# Patient Record
Sex: MALE | Race: WHITE | NOT HISPANIC OR LATINO | Employment: UNEMPLOYED | URBAN - METROPOLITAN AREA
[De-identification: names, ages, dates, MRNs, and addresses within clinical notes are randomized per-mention and may not be internally consistent; named-entity substitution may affect disease eponyms.]

---

## 2017-01-03 ENCOUNTER — OFFICE VISIT (OUTPATIENT)
Dept: BEHAVIORAL HEALTH | Facility: CLINIC | Age: 57
End: 2017-01-03
Payer: COMMERCIAL

## 2017-01-03 DIAGNOSIS — F33.1 MAJOR DEPRESSIVE DISORDER, RECURRENT EPISODE, MODERATE (H): Primary | ICD-10-CM

## 2017-01-03 PROCEDURE — 90834 PSYTX W PT 45 MINUTES: CPT | Performed by: PSYCHOLOGIST

## 2017-01-03 NOTE — PROGRESS NOTES
Progress Note    Client Name: Darrius Goldman  Date: 01/03/17    Service Type: Individual There was an  present. Session Start Time: 4:00 Session End Time: 3:00 Session Length: 50     Session 8     Attendees: Client attended alone    Treatment Plan Last Reviewed:   PHQ-9 / GIAN-7 :      DATA      Progress Since Last Session (Related to Symptoms / Goals / Homework):   Symptoms: Stable  I saw Darrius today.  .  Darrius came in and said that he is doing OK.  Because of so little money there is little he can do.  He is hoping that things change.  I tried to push some at getting him to be more active but he is not wanting to do that.  He does have a friend and a sister he does spend time with.  He has contacted an  and the process will started again.     Episode of Care Goals: Satisfactory progress - PREPARATION (Decided to change - considering how); Intervened by negotiating a change plan and determining options / strategies for behavior change, identifying triggers, exploring social supports, and working towards setting a date to begin behavior change     Current / Ongoing Stressors and Concerns:   He is taking care of himself, accepting what he needs to in terms of finances and health.    My fears are that life is on hold because of lack of funds.   Treatment Objective(s) Addressed in This Session:   identify 2 strategies for improving mood  So far the art work and contact with friends is the only things I have identified.     Intervention:   CBT: .        ASSESSMENT: Current Emotional / Mental Status (status of significant symptoms):   Risk status (Self / Other harm or suicidal ideation)   Client denies current fears or concerns for personal safety.   Client denies current or recent suicidal ideation or behaviors.   Client denies current or recent homicidal ideation or behaviors.   Client denies current or recent self injurious behavior or ideation.   Client  denies other safety concerns.   A safety and risk management plan has not been developed at this time, however client was given the after-hours number / 911 should there be a change in any of these risk factors.     Appearance:   Appropriate    Eye Contact:   Good    Psychomotor Behavior: Normal    Attitude:   Cooperative    Orientation:   All   Speech    Rate / Production: Normal     Volume:  Normal    Mood:    Normal   Affect:    Appropriate    Thought Content:  Clear    Thought Form:  Coherent  Logical    Insight:    Good      Medication Review:   No current psychiatric medications prescribed     Medication Compliance:   NA     Changes in Health Issues:   There are no changes in health but sleep is still difficult because of pain.     Chemical Use Review:   Substance Use: Chemical use reviewed, no active concerns identified      Tobacco Use: No current tobacco use.       Collateral Reports Completed:   Not Applicable    PLAN: (Client Tasks / Therapist Tasks / Other)  See in a few weeks.  He has obtained an .        Doris Chavez LP                                                         ________________________________________________________________________    Treatment Plan    Client's Name: Darrius Goldman  YOB: 1960    Date: 07/28/16    DSM-V Diagnoses: 300.4 Persistent Depressive Disorder, Moderate  Psychosocial / Contextual Factors: Immigrant with few personal contacts.  He does not see family and cannot work at this time.  WHODAS:     Referral / Collaboration:  Was/were discussed and client will pursue. If he would agree I would make referral for .    Anticipated number of session or this episode of care: 6-8      MeasurableTreatment Goal(s) related to diagnosis / functional impairment(s)  Goal 1: Client will continue the process of trying to get SS.  This is to provide a stable income for rent payments and basic necessities. At this time he gets the rent payments from  friends, but that is not guaranteed.    I will know I've met my goal when my basic needs are met from my income freeing me up to per chasity other things.     Objective #A (Client Action)    Client will identify 2 strategies for improving mood.  Status: New - Date: 07/28/16     Intervention(s)  Therapist will assign homework Mensur will continue to do the art work that bring him pleasure..    Objective #B  Client will identify 2 strategies for improving mood.  Status: New - Date: 07/28/16     Intervention(s)  Therapist will encourage him to see friends from the [facility] community..    Objective #C  Client will identify continue to persue the SS. strategies for improving mood.  Status: New - Date: 07/28/16     Intervention(s)  Therapist will help with any forms that could help the process along or validate his depression..    Cale   has reviewed and agreed to the above plan.      Doris Chavez LP  July 31, 2016

## 2017-01-24 ENCOUNTER — OFFICE VISIT (OUTPATIENT)
Dept: BEHAVIORAL HEALTH | Facility: CLINIC | Age: 57
End: 2017-01-24
Payer: COMMERCIAL

## 2017-01-24 DIAGNOSIS — F33.1 MAJOR DEPRESSIVE DISORDER, RECURRENT EPISODE, MODERATE (H): Primary | ICD-10-CM

## 2017-01-24 PROCEDURE — 90834 PSYTX W PT 45 MINUTES: CPT | Performed by: PSYCHOLOGIST

## 2017-01-28 NOTE — PROGRESS NOTES
Progress Note    Client Name: Darrius Goldman  Date: 01/24/17Service Type: Individual There was an  present. Session Start Time: 4:00 Session End Time: 3:00 Session Length: 50     Session 9     Attendees: Client attended alone    Treatment Plan Last Reviewed:   PHQ-9 / GIAN-7 :      DATA      Progress Since Last Session (Related to Symptoms / Goals / Homework):   Symptoms: Stable  I saw Darrius today.  .  Darrius came in and said that he is doing OK.  Because of so little money there is little he can do.  He is hoping that things change.  I tried to push some at getting him to be more active but he is not wanting to do that.  He does have a friend and a sister he does spend time with.  He has contacted an  and the process will started again. We did fill out the forms that he needs for the [facility].      Episode of Care Goals: Satisfactory progress - PREPARATION (Decided to change - considering how); Intervened by negotiating a change plan and determining options / strategies for behavior change, identifying triggers, exploring social supports, and working towards setting a date to begin behavior change     Current / Ongoing Stressors and Concerns:   He is taking care of himself, accepting what he needs to in terms of finances and health.    My fears are that life is on hold because of lack of funds.   Treatment Objective(s) Addressed in This Session:   identify 2 strategies for improving mood  So far the art work and contact with friends is the only things I have identified.     Intervention:   CBT: .        ASSESSMENT: Current Emotional / Mental Status (status of significant symptoms):   Risk status (Self / Other harm or suicidal ideation)   Client denies current fears or concerns for personal safety.   Client denies current or recent suicidal ideation or behaviors.   Client denies current or recent homicidal ideation or behaviors.   Client denies current or  recent self injurious behavior or ideation.   Client denies other safety concerns.   A safety and risk management plan has not been developed at this time, however client was given the after-hours number / 911 should there be a change in any of these risk factors.     Appearance:   Appropriate    Eye Contact:   Good    Psychomotor Behavior: Normal    Attitude:   Cooperative    Orientation:   All   Speech    Rate / Production: Normal     Volume:  Normal    Mood:    Normal   Affect:    Appropriate    Thought Content:  Clear    Thought Form:  Coherent  Logical    Insight:    Good      Medication Review:   No current psychiatric medications prescribed     Medication Compliance:   NA     Changes in Health Issues:   There are no changes in health but sleep is still difficult because of pain.     Chemical Use Review:   Substance Use: Chemical use reviewed, no active concerns identified      Tobacco Use: No current tobacco use.       Collateral Reports Completed:   Not Applicable    PLAN: (Client Tasks / Therapist Tasks / Other)  See in a few weeks.  He has obtained an .        Doris Chavez LP                                                         ________________________________________________________________________    Treatment Plan    Client's Name: Darrius Goldman  YOB: 1960    Date: 07/28/16    DSM-V Diagnoses: 300.4 Persistent Depressive Disorder, Moderate  Psychosocial / Contextual Factors: Immigrant with few personal contacts.  He does not see family and cannot work at this time.  WHODAS:     Referral / Collaboration:  Was/were discussed and client will pursue. If he would agree I would make referral for .    Anticipated number of session or this episode of care: 6-8      MeasurableTreatment Goal(s) related to diagnosis / functional impairment(s)  Goal 1: Client will continue the process of trying to get SS.  This is to provide a stable income for rent payments and basic  necessities. At this time he gets the rent payments from friends, but that is not guaranteed.    I will know I've met my goal when my basic needs are met from my income freeing me up to per chasity other things.     Objective #A (Client Action)    Client will identify 2 strategies for improving mood.  Status: New - Date: 07/28/16     Intervention(s)  Therapist will assign homework Menscesar will continue to do the art work that bring him pleasure..    Objective #B  Client will identify 2 strategies for improving mood.  Status: New - Date: 07/28/16     Intervention(s)  Therapist will encourage him to see friends from the [facility] community..    Objective #C  Client will identify continue to persue the SS. strategies for improving mood.  Status: New - Date: 07/28/16     Intervention(s)  Therapist will help with any forms that could help the process along or validate his depression..    Cale   has reviewed and agreed to the above plan.      Doris Chavez LP  July 31, 2016

## 2017-02-14 ENCOUNTER — OFFICE VISIT (OUTPATIENT)
Dept: BEHAVIORAL HEALTH | Facility: CLINIC | Age: 57
End: 2017-02-14
Payer: COMMERCIAL

## 2017-02-14 DIAGNOSIS — F33.1 MAJOR DEPRESSIVE DISORDER, RECURRENT EPISODE, MODERATE (H): Primary | ICD-10-CM

## 2017-02-14 PROCEDURE — 90834 PSYTX W PT 45 MINUTES: CPT | Performed by: PSYCHOLOGIST

## 2017-02-18 NOTE — PROGRESS NOTES
Progress Note    Client Name: Darrius Goldman  Date: 02/14/17Service Type: Individual There was an  present. Session Start Time: 12:30 Session End Time: 1;30 Session Length: 50     Session 10     Attendees: Client attended alone    Treatment Plan Last Reviewed:   PHQ-9 / GIAN-7 :      DATA      Progress Since Last Session (Related to Symptoms / Goals / Homework):   Symptoms: Stable  I saw Darrius today. He was present with the .  States that he has been more sad and down of late.  He has seen his sister and does  Go there every couple of weeks.  Also sees his friend who pays for his rent about 2 times a month.  Outside of that, I am not hearing about things that he does.  He has not gotten SS yet and there is no news on it.  He had received SS but after the cancer diagnosis was in remission with no new signs of cancer , he stopped getting it.  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  He will call his  to see what is happening.  We did talk about treatment plan and what he gets from coming here.  He says that he feels that he trust and benefits from our conversations.  We do not have any strictly behavioral goals.  If anything a behavioral activation plan would make sense if he would do it.    .         Episode of Care Goals: Satisfactory progress - PREPARATION (Decided to change - considering how); Intervened by negotiating a change plan and determining options / strategies for behavior change, identifying triggers, exploring social supports, and working towards setting a date to begin behavior change     Current / Ongoing Stressors and Concerns:   He is taking care of himself, accepting what he needs to in terms of finances and health.    My fears are that life is on hold because of lack of funds.   Treatment Objective(s) Addressed in This Session:   identify 2  strategies for improving mood  So far the art work and contact with friends is the only things I have identified.     Intervention:   CBT: .        ASSESSMENT: Current Emotional / Mental Status (status of significant symptoms):   Risk status (Self / Other harm or suicidal ideation)   Client denies current fears or concerns for personal safety.   Client denies current or recent suicidal ideation or behaviors.   Client denies current or recent homicidal ideation or behaviors.   Client denies current or recent self injurious behavior or ideation.   Client denies other safety concerns.   A safety and risk management plan has not been developed at this time, however client was given the after-hours number / 911 should there be a change in any of these risk factors.     Appearance:   Appropriate    Eye Contact:   Good    Psychomotor Behavior: Normal    Attitude:   Cooperative    Orientation:   All   Speech    Rate / Production: Normal     Volume:  Normal    Mood:    Normal   Affect:    Appropriate    Thought Content:  Clear    Thought Form:  Coherent  Logical    Insight:    Good      Medication Review:   No current psychiatric medications prescribed     Medication Compliance:   NA     Changes in Health Issues:   There are no changes in health but sleep is still difficult because of pain.     Chemical Use Review:   Substance Use: Chemical use reviewed, no active concerns identified      Tobacco Use: No current tobacco use.       Collateral Reports Completed:   Not Applicable    PLAN: (Client Tasks / Therapist Tasks / Other)  See in a few weeks.  He has obtained an .        Doris Chavez LP                                                         ________________________________________________________________________    Treatment Plan    Client's Name: Darrius oGldman  YOB: 1960    Date: 07/28/16    DSM-V Diagnoses: 300.4 Persistent Depressive Disorder, Moderate  Psychosocial / Contextual Factors:  Immigrant with few personal contacts.  He does not see family and cannot work at this time.  WHODAS:     Referral / Collaboration:  Was/were discussed and client will pursue. If he would agree I would make referral for .    Anticipated number of session or this episode of care: 6-8      MeasurableTreatment Goal(s) related to diagnosis / functional impairment(s)  Goal 1: Client will continue the process of trying to get SS.  This is to provide a stable income for rent payments and basic necessities. At this time he gets the rent payments from friends, but that is not guaranteed.    I will know I've met my goal when my basic needs are met from my income freeing me up to per chasity other things.     Objective #A (Client Action)    Client will identify 2 strategies for improving mood.  Status: New - Date: 07/28/16     Intervention(s)  Therapist will assign homework Mensur will continue to do the art work that bring him pleasure..    Objective #B  Client will identify 2 strategies for improving mood.  Status: New - Date: 07/28/16     Intervention(s)  Therapist will encourage him to see friends from the [facility] community..    Objective #C  Client will identify continue to persue the SS. strategies for improving mood.  Status: New - Date: 07/28/16     Intervention(s)  Therapist will help with any forms that could help the process along or validate his depression..    Cale   has reviewed and agreed to the above plan.      Doris Chavez LP  July 31, 2016

## 2017-03-07 ENCOUNTER — OFFICE VISIT (OUTPATIENT)
Dept: BEHAVIORAL HEALTH | Facility: CLINIC | Age: 57
End: 2017-03-07
Payer: COMMERCIAL

## 2017-03-07 DIAGNOSIS — F33.1 MAJOR DEPRESSIVE DISORDER, RECURRENT EPISODE, MODERATE (H): Primary | ICD-10-CM

## 2017-03-07 PROCEDURE — 90834 PSYTX W PT 45 MINUTES: CPT | Performed by: PSYCHOLOGIST

## 2017-03-11 NOTE — PROGRESS NOTES
Progress Note    Client Name: Darrius Goldman  Date:   03/07/17    Service Type: Individual There was an  present. Session Start Time: 12:30 Session End Time: 1;30 Session Length: 50     Session 11     Attendees: Client attended alone    Treatment Plan Last Reviewed:   PHQ-9 / GIAN-7 :      DATA      Progress Since Last Session (Related to Symptoms / Goals / Homework):   Symptoms: Stable  I saw Darrius today. He was present with the .  States that he has been more sad and down of late. He did talk to his  and there are some things that SS is waiting for to finish the paperwork for the claim.  He is thnking it may be closer to a year before he is able to get something.  He has not been working and I did ask if he felt that he could.  He has had more back problems of late and in more pain.  I have talked to him about some work that would not have the physical demands that galo [employer] did.  He is bright, able and although somewhat depressed, he does like people and I think he might do well well with some part time jobs that are not physically demanding.   He does see his friend, and his sister.       Episode of Care Goals: Satisfactory progress - PREPARATION (Decided to change - considering how); Intervened by negotiating a change plan and determining options / strategies for behavior change, identifying triggers, exploring social supports, and working towards setting a date to begin behavior change     Current / Ongoing Stressors and Concerns:   He is taking care of himself, accepting what he needs to in terms of finances and health.    My fears are that life is on hold because of lack of funds.   Treatment Objective(s) Addressed in This Session:   identify 2 strategies for improving mood  So far the art work and contact with friends is the only things I have identified.     Intervention:   CBT: .        ASSESSMENT: Current Emotional / Mental  Status (status of significant symptoms):   Risk status (Self / Other harm or suicidal ideation)   Client denies current fears or concerns for personal safety.   Client denies current or recent suicidal ideation or behaviors.   Client denies current or recent homicidal ideation or behaviors.   Client denies current or recent self injurious behavior or ideation.   Client denies other safety concerns.   A safety and risk management plan has not been developed at this time, however client was given the after-hours number / 911 should there be a change in any of these risk factors.     Appearance:   Appropriate    Eye Contact:   Good    Psychomotor Behavior: Normal    Attitude:   Cooperative    Orientation:   All   Speech    Rate / Production: Normal     Volume:  Normal    Mood:    Normal   Affect:    Appropriate    Thought Content:  Clear    Thought Form:  Coherent  Logical    Insight:    Good      Medication Review:   No current psychiatric medications prescribed     Medication Compliance:   NA     Changes in Health Issues:   There are no changes in health but sleep is still difficult because of pain.     Chemical Use Review:   Substance Use: Chemical use reviewed, no active concerns identified      Tobacco Use: No current tobacco use.       Collateral Reports Completed:   Not Applicable    PLAN: (Client Tasks / Therapist Tasks / Other)  See in a few weeks.  He has obtained an .        Doris Chavez LP                                                         ________________________________________________________________________    Treatment Plan    Client's Name: Darrius Goldman  YOB: 1960    Date: 07/28/16    DSM-V Diagnoses: 300.4 Persistent Depressive Disorder, Moderate  Psychosocial / Contextual Factors: Immigrant with few personal contacts.  He does not see family and cannot work at this time.  WHODAS:     Referral / Collaboration:  Was/were discussed and client will pursue. If he would agree  I would make referral for .    Anticipated number of session or this episode of care: 6-8      MeasurableTreatment Goal(s) related to diagnosis / functional impairment(s)  Goal 1: Client will continue the process of trying to get SS.  This is to provide a stable income for rent payments and basic necessities. At this time he gets the rent payments from friends, but that is not guaranteed.    I will know I've met my goal when my basic needs are met from my income freeing me up to per chasity other things.     Objective #A (Client Action)    Client will identify 2 strategies for improving mood.  Status: New - Date: 07/28/16     Intervention(s)  Therapist will assign homework Mensur will continue to do the art work that bring him pleasure..    Objective #B  Client will identify 2 strategies for improving mood.  Status: New - Date: 07/28/16     Intervention(s)  Therapist will encourage him to see friends from the [facility] community..    Objective #C  Client will identify continue to persue the SS. strategies for improving mood.  Status: New - Date: 07/28/16     Intervention(s)  Therapist will help with any forms that could help the process along or validate his depression..    Cale   has reviewed and agreed to the above plan.      Doris Chavez LP  July 31, 2016

## 2017-03-14 ENCOUNTER — TRANSFERRED RECORDS (OUTPATIENT)
Dept: HEALTH INFORMATION MANAGEMENT | Facility: CLINIC | Age: 57
End: 2017-03-14

## 2017-03-28 ENCOUNTER — OFFICE VISIT (OUTPATIENT)
Dept: BEHAVIORAL HEALTH | Facility: CLINIC | Age: 57
End: 2017-03-28
Payer: COMMERCIAL

## 2017-03-28 DIAGNOSIS — F33.1 MAJOR DEPRESSIVE DISORDER, RECURRENT EPISODE, MODERATE (H): Primary | ICD-10-CM

## 2017-03-28 PROCEDURE — 90834 PSYTX W PT 45 MINUTES: CPT | Performed by: PSYCHOLOGIST

## 2017-04-09 NOTE — PROGRESS NOTES
Progress Note    Client Name: Darrius Goldman  Date:  03/28/17    Service Type: Individual There was an  present. Session Start Time: 12:30 Session End Time: 1;30 Session Length: 50     Session 12     Attendees: Client and     Treatment Plan Last Reviewed:   PHQ-9 / GIAN-7 :      DATA      Progress Since Last Session (Related to Symptoms / Goals / Homework):   Symptoms: Stable  I saw Darrius today. He was present with the .  States that he continues to be sad and knowing that he should be doing something, but doing very little.  He is active with his sister, some friends that he knows and does some things with.  He does not know the people in the apartment building and while he does know the language well enough, he is not likely to start a conversation.    When I bring up work, he is aware that he could do some limited work, like a  or some light duty work for a brief time each day.  He is afraid that he will make is harder to collect SSI if he does that and I am not sure of that.  He is aware that he needs something to do and also some dignity that comes with some money to pay his own bills.   Episode of Care Goals: Satisfactory progress - PREPARATION (Decided to change - considering how); Intervened by negotiating a change plan and determining options / strategies for behavior change, identifying triggers, exploring social supports, and working towards setting a date to begin behavior change     Current / Ongoing Stressors and Concerns:   He is taking care of himself, accepting what he needs to in terms of finances and health.    My fears are that life is on hold because of lack of funds.   Treatment Objective(s) Addressed in This Session:   identify 2 strategies for improving mood  So far the art work and contact with friends is the only things I have identified.     Intervention:   CBT: .        ASSESSMENT: Current Emotional /  Mental Status (status of significant symptoms):   Risk status (Self / Other harm or suicidal ideation)   Client denies current fears or concerns for personal safety.   Client denies current or recent suicidal ideation or behaviors.   Client denies current or recent homicidal ideation or behaviors.   Client denies current or recent self injurious behavior or ideation.   Client denies other safety concerns.   A safety and risk management plan has not been developed at this time, however client was given the after-hours number / 911 should there be a change in any of these risk factors.     Appearance:   Appropriate    Eye Contact:   Good    Psychomotor Behavior: Normal    Attitude:   Cooperative    Orientation:   All   Speech    Rate / Production: Normal     Volume:  Normal    Mood:    Normal   Affect:    Appropriate    Thought Content:  Clear    Thought Form:  Coherent  Logical    Insight:    Good      Medication Review:   No current psychiatric medications prescribed     Medication Compliance:   NA     Changes in Health Issues:   There are no changes in health but sleep is still difficult because of pain.     Chemical Use Review:   Substance Use: Chemical use reviewed, no active concerns identified      Tobacco Use: No current tobacco use.       Collateral Reports Completed:   Not Applicable    PLAN: (Client Tasks / Therapist Tasks / Other)  See in a few weeks.  He has obtained an .        Doris Chavez LP                                                         ________________________________________________________________________    Treatment Plan    Client's Name: Darrius Goldman  YOB: 1960    Date: 07/28/16    DSM-V Diagnoses: 300.4 Persistent Depressive Disorder, Moderate  Psychosocial / Contextual Factors: Immigrant with few personal contacts.  He does not see family and cannot work at this time.  WHODAS:     Referral / Collaboration:  Was/were discussed and client will pursue. If he would  agree I would make referral for .    Anticipated number of session or this episode of care: 6-8      MeasurableTreatment Goal(s) related to diagnosis / functional impairment(s)  Goal 1: Client will continue the process of trying to get SS.  This is to provide a stable income for rent payments and basic necessities. At this time he gets the rent payments from friends, but that is not guaranteed.    I will know I've met my goal when my basic needs are met from my income freeing me up to per chasity other things.     Objective #A (Client Action)    Client will identify 2 strategies for improving mood.  Status: New - Date: 07/28/16     Intervention(s)  Therapist will assign homework Mensur will continue to do the art work that bring him pleasure..    Objective #B  Client will identify 2 strategies for improving mood.  Status: New - Date: 07/28/16     Intervention(s)  Therapist will encourage him to see friends from the [facility] community..    Objective #C  Client will identify continue to persue the SS. strategies for improving mood.  Status: New - Date: 07/28/16     Intervention(s)  Therapist will help with any forms that could help the process along or validate his depression..    Cale   has reviewed and agreed to the above plan.      Doris Chavez LP  July 31, 2016

## 2018-04-06 ENCOUNTER — TRANSFERRED RECORDS (OUTPATIENT)
Dept: HEALTH INFORMATION MANAGEMENT | Facility: CLINIC | Age: 58
End: 2018-04-06

## 2018-08-28 ENCOUNTER — OFFICE VISIT (OUTPATIENT)
Dept: OPTOMETRY | Facility: CLINIC | Age: 58
End: 2018-08-28
Payer: COMMERCIAL

## 2018-08-28 ENCOUNTER — APPOINTMENT (OUTPATIENT)
Dept: OPTOMETRY | Facility: CLINIC | Age: 58
End: 2018-08-28
Payer: COMMERCIAL

## 2018-08-28 DIAGNOSIS — H52.4 PRESBYOPIA: Primary | ICD-10-CM

## 2018-08-28 DIAGNOSIS — H52.03 HYPERMETROPIA OF BOTH EYES: ICD-10-CM

## 2018-08-28 DIAGNOSIS — H52.223 REGULAR ASTIGMATISM OF BOTH EYES: ICD-10-CM

## 2018-08-28 DIAGNOSIS — H25.011 CORTICAL AGE-RELATED CATARACT OF RIGHT EYE: ICD-10-CM

## 2018-08-28 PROCEDURE — 92341 FIT SPECTACLES BIFOCAL: CPT | Performed by: OPTOMETRIST

## 2018-08-28 PROCEDURE — 92014 COMPRE OPH EXAM EST PT 1/>: CPT | Performed by: OPTOMETRIST

## 2018-08-28 PROCEDURE — 92015 DETERMINE REFRACTIVE STATE: CPT | Performed by: OPTOMETRIST

## 2018-08-28 ASSESSMENT — VISUAL ACUITY
CORRECTION_TYPE: GLASSES
OS_SC+: -2
OD_CC: 20/25
OS_CC: 20/30-1
METHOD_MR: SIGNS OK
OD_CC: 20/40
OD_SC+: -1
OD_CC+: -2
METHOD: SNELLEN - LINEAR
OD_SC: 20/70
OS_CC: 20/20
OS_SC: 20/30

## 2018-08-28 ASSESSMENT — REFRACTION_MANIFEST
OS_CYLINDER: +0.50
OD_ADD: +2.50
OD_SPHERE: -1.25
OS_ADD: +2.50
OD_AXIS: 005
OS_AXIS: 135
OD_CYLINDER: +1.75
OS_SPHERE: PLANO

## 2018-08-28 ASSESSMENT — SLIT LAMP EXAM - LIDS
COMMENTS: NORMAL
COMMENTS: NORMAL

## 2018-08-28 ASSESSMENT — REFRACTION_WEARINGRX
OD_ADD: +2.50
OD_CYLINDER: +1.50
OS_ADD: +2.50
OS_CYLINDER: +0.50
OD_AXIS: 005
OS_AXIS: 125
OS_SPHERE: PLANO
SPECS_TYPE: BIFOCAL
OD_SPHERE: -1.25

## 2018-08-28 ASSESSMENT — CONF VISUAL FIELD
OS_NORMAL: 1
OD_NORMAL: 1

## 2018-08-28 ASSESSMENT — TONOMETRY
IOP_METHOD: TONOPEN
OD_IOP_MMHG: 13
OS_IOP_MMHG: 13

## 2018-08-28 ASSESSMENT — CUP TO DISC RATIO
OS_RATIO: 0.3
OD_RATIO: 0.2

## 2018-08-28 ASSESSMENT — EXTERNAL EXAM - LEFT EYE: OS_EXAM: NORMAL

## 2018-08-28 ASSESSMENT — EXTERNAL EXAM - RIGHT EYE: OD_EXAM: NORMAL

## 2018-08-28 NOTE — PATIENT INSTRUCTIONS
Recommend new glasses.    You have the start of mild cataract right eye.  You may notice some blurred vision or glare with night driving.  It is important that you wear good sunglasses to protect your eyes from the ultraviolet light from the sun.  Taking a supplement with at least 300 mg/day of vitamin C appears to help prevent cataract development.  I recommend that you return in 1 year for an eye exam unless there are any sudden changes in your vision.       Return in 1 year for a complete eye exam or sooner if needed.    Dwight Downs, OD    The affects of the dilating drops last for 4- 6 hours.  You will be more sensitive to light and vision will be blurry up close.  Mydriatic sunglasses were given if needed.      Optometry Providers       Clinic Locations                                 Telephone Number   [facility]   [facility] [phone]     [place] [facility] Hours:                [place] [facility] Hours:       [place] [facility] Hours:   [address] [address] [facility] [address]   [address] [facility] [facility]     [address] [place] [address]   [place] [address]    [place] [address]  Phone: [phone]                    Phone: [phone]     Phone: [phone]                      Monday 8:00-7:00                          Monday 8:00-7:00                          Monday 8:00-7:00              Tuesday 8:00-6:00                          Tuesday 8:00-7:00                          Tuesday 8:00-7:00              Wednesday 8:00-6:00                  Wednesday 8:00-7:00                   Wednesday 8:00-7:00      Thursday 8:00-6:00                        Thursday 8:00-7:00                         Thursday 8:00-7:00            Friday 8:00-5:00                              Friday 8:00-5:00                              Friday 8:00-5:00    [facility] Hours:   [address] [facility] [address]  [phone]    Monday 8:00-7:00  Tuesday 8:00-7:00  Wednesday 8:00-7:00  Thursday 8:00-7:00  Friday 8:00-5:00  Please log on to [email or web address] to order your contact lenses.  The link is found on the [facility] page.  As always, Thank you for trusting us with your health care needs!

## 2018-08-28 NOTE — LETTER
8/28/2018         RE: Darrius Goldman  [address]  [facility] [address]        Dear Colleague,    Thank you for referring your patient, Darrius Goldman, to the [facility]. Please see a copy of my visit note below.    Chief Complaint   Patient presents with     COMPREHENSIVE EYE EXAM      Accompanied by   Last Eye Exam: 8-2-2016  Dilated Previously: Yes    What are you currently using to see?  glasses       Distance Vision Acuity: Satisfied with vision    Near Vision Acuity: Satisfied with vision while reading  with glasses    Eye Comfort: good  Do you use eye drops? : No  Occupation or Hobbies: none    Elis Moseley Optometric Assistant, A.B.O.C.          Medical, surgical and family histories reviewed and updated 8/28/2018.       OBJECTIVE: See Ophthalmology exam    ASSESSMENT:    ICD-10-CM    1. Presbyopia H52.4 EYE EXAM (SIMPLE-NONBILLABLE)     REFRACTION   2. Regular astigmatism of both eyes H52.223 EYE EXAM (SIMPLE-NONBILLABLE)     REFRACTION   3. Hypermetropia of both eyes H52.03 EYE EXAM (SIMPLE-NONBILLABLE)     REFRACTION   4. Cortical age-related cataract of right eye H25.011       PLAN:     Patient Instructions   Recommend new glasses.    You have the start of mild cataract right eye.  You may notice some blurred vision or glare with night driving.  It is important that you wear good sunglasses to protect your eyes from the ultraviolet light from the sun.  Taking a supplement with at least 300 mg/day of vitamin C appears to help prevent cataract development.  I recommend that you return in 1 year for an eye exam unless there are any sudden changes in your vision.       Return in 1 year for a complete eye exam or sooner if needed.    Dwight Downs, OD           Again, thank you for allowing me to participate in the care of your patient.        Sincerely,        Dwight Downs, OD

## 2018-08-28 NOTE — MR AVS SNAPSHOT
After Visit Summary   8/28/2018    Darrius Goldman    MRN: [id number]           Patient Information     Date Of Birth          1960        Visit Information        Provider Department      8/28/2018 11:25 AM Dwight Downs, OD; [facility] [employer] [facility]        Today's Diagnoses     Presbyopia    -  1    Regular astigmatism of both eyes        Hypermetropia of both eyes        Cortical age-related cataract of right eye          Care Instructions    Recommend new glasses.    You have the start of mild cataract right eye.  You may notice some blurred vision or glare with night driving.  It is important that you wear good sunglasses to protect your eyes from the ultraviolet light from the sun.  Taking a supplement with at least 300 mg/day of vitamin C appears to help prevent cataract development.  I recommend that you return in 1 year for an eye exam unless there are any sudden changes in your vision.       Return in 1 year for a complete eye exam or sooner if needed.    Dwight Downs, OD    The affects of the dilating drops last for 4- 6 hours.  You will be more sensitive to light and vision will be blurry up close.  Mydriatic sunglasses were given if needed.      Optometry Providers       Clinic Locations                                 Telephone Number   Dr. Tia [place] [phone]     [place] [facility] Hours:                [place] [facility] Hours:       [place] [facility] Hours:   [address]   [address] [address]     [address] [facility] [place] [address]   [place] [address]    [place] [address]  Phone: [phone]                    Phone: [phone]     Phone: [phone]                      Monday 8:00-7:00                          Monday 8:00-7:00                          Monday 8:00-7:00               Tuesday 8:00-6:00                          Tuesday 8:00-7:00                          Tuesday 8:00-7:00              Wednesday 8:00-6:00                  Wednesday 8:00-7:00 Wednesday 8:00-7:00      Thursday 8:00-6:00                        Thursday 8:00-7:00                         Thursday 8:00-7:00 Friday 8:00-5:00                              Friday 8:00-5:00 Friday 8:00-5:00    [facility] Hours:   [address] [place] [address]  [phone]    Monday 8:00-7:00 Tuesday 8:00-7:00 Wednesday 8:00-7:00 Thursday 8:00-7:00 Friday 8:00-5:00  Please log on to [email or web address] to order your contact lenses.  The link is found on the Eye Care and Vision Services page.  As always, Thank you for trusting us with your health care needs!              Follow-ups after your visit        Follow-up notes from your care team     Return in about 1 year (around 8/28/2019) for Annual Visit.      Who to contact     If you have questions or need follow up information about today's clinic visit or your schedule please contact [facility] directly at [phone].  Normal or non-critical lab and imaging results will be communicated to you by MyChart, letter or phone within 4 business days after the clinic has received the results. If you do not hear from us within 7 days, please contact the clinic through MyChart or phone. If you have a critical or abnormal lab result, we will notify you by phone as soon as possible.  Submit refill requests through [employer] or call your pharmacy and they will forward the refill request to us. Please allow 3 business days for your refill to be completed.          Additional Information About Your Visit        Care EveryWhere ID     This is your Care EveryWhere ID. This could be used by other organizations to access your [facility] medical records  [id number]         Blood Pressure from Last 3 Encounters:   05/20/16  139/78   04/13/11 108/72   04/04/11 121/69    Weight from Last 3 Encounters:   05/20/16 63.5 kg (140 lb)   04/13/11 57.1 kg (125 lb 12.8 oz)   04/04/11 58.7 kg (129 lb 6.4 oz)              We Performed the Following     EYE EXAM (SIMPLE-NONBILLABLE)     REFRACTION        Primary Care Provider Office Phone # Fax #    Lizzette Byrnes [phone] [address] [facility] [address]        Equal Access to Services     [facility]: Hadii aad ku hadasho Soomaali, waaxda luqadaha, qaybta kaalmada ademichiyachepe, desmond carrington . So [facility] [phone].    ATENCIÓN: Si habla español, tiene a pierce disposición servicios gratuitos de asistencia lingüística. [facility] [phone].    We comply with applicable federal civil rights laws and [place] laws. We do not discriminate on the basis of race, color, national origin, age, disability, sex, sexual orientation, or gender identity.            Thank you!     Thank you for choosing [facility]  for your care. Our goal is always to provide you with excellent care. Hearing back from our patients is one way we can continue to improve our services. Please take a few minutes to complete the written survey that you may receive in the mail after your visit with us. Thank you!             Your Updated Medication List - Protect others around you: Learn how to safely use, store and throw away your medicines at www.disposemymeds.org.      Notice  As of 8/28/2018  2:17 PM    You have not been prescribed any medications.

## 2018-08-28 NOTE — PROGRESS NOTES
Chief Complaint   Patient presents with     COMPREHENSIVE EYE EXAM      Accompanied by   Last Eye Exam: 8-2-2016  Dilated Previously: Yes    What are you currently using to see?  glasses       Distance Vision Acuity: Satisfied with vision    Near Vision Acuity: Satisfied with vision while reading  with glasses    Eye Comfort: good  Do you use eye drops? : No  Occupation or Hobbies: none    Elis Moseley Optometric Assistant, A.B.O.C.          Medical, surgical and family histories reviewed and updated 8/28/2018.       OBJECTIVE: See Ophthalmology exam    ASSESSMENT:    ICD-10-CM    1. Presbyopia H52.4 EYE EXAM (SIMPLE-NONBILLABLE)     REFRACTION   2. Regular astigmatism of both eyes H52.223 EYE EXAM (SIMPLE-NONBILLABLE)     REFRACTION   3. Hypermetropia of both eyes H52.03 EYE EXAM (SIMPLE-NONBILLABLE)     REFRACTION   4. Cortical age-related cataract of right eye H25.011       PLAN:     Patient Instructions   Recommend new glasses.    You have the start of mild cataract right eye.  You may notice some blurred vision or glare with night driving.  It is important that you wear good sunglasses to protect your eyes from the ultraviolet light from the sun.  Taking a supplement with at least 300 mg/day of vitamin C appears to help prevent cataract development.  I recommend that you return in 1 year for an eye exam unless there are any sudden changes in your vision.       Return in 1 year for a complete eye exam or sooner if needed.    Dwight Downs, OD

## 2020-08-06 ENCOUNTER — OFFICE VISIT (OUTPATIENT)
Dept: OPTOMETRY | Facility: CLINIC | Age: 60
End: 2020-08-06
Payer: COMMERCIAL

## 2020-08-06 DIAGNOSIS — H52.223 REGULAR ASTIGMATISM OF BOTH EYES: ICD-10-CM

## 2020-08-06 DIAGNOSIS — H25.13 NUCLEAR AGE-RELATED CATARACT, BOTH EYES: ICD-10-CM

## 2020-08-06 DIAGNOSIS — H52.4 PRESBYOPIA: ICD-10-CM

## 2020-08-06 DIAGNOSIS — Z01.01 ENCOUNTER FOR EXAMINATION OF EYES AND VISION WITH ABNORMAL FINDINGS: Primary | ICD-10-CM

## 2020-08-06 PROCEDURE — 92015 DETERMINE REFRACTIVE STATE: CPT | Performed by: OPTOMETRIST

## 2020-08-06 PROCEDURE — 92014 COMPRE OPH EXAM EST PT 1/>: CPT | Performed by: OPTOMETRIST

## 2020-08-06 ASSESSMENT — REFRACTION_WEARINGRX
OS_AXIS: 135
OD_ADD: +2.50
OS_ADD: +2.50
OD_CYLINDER: +1.75
OS_SPHERE: PLANO
OS_CYLINDER: +0.50
OD_AXIS: 005
SPECS_TYPE: BIFOCAL
OD_SPHERE: -1.25

## 2020-08-06 ASSESSMENT — REFRACTION_MANIFEST
OS_ADD: +2.50
OS_SPHERE: +0.25
OD_ADD: +2.50
OS_AXIS: 142
OD_CYLINDER: +2.00
OD_SPHERE: -1.00
OD_AXIS: 007
OS_CYLINDER: +1.00

## 2020-08-06 ASSESSMENT — TONOMETRY
OD_IOP_MMHG: 20
IOP_METHOD: APPLANATION
OS_IOP_MMHG: 19

## 2020-08-06 ASSESSMENT — CONF VISUAL FIELD
OS_NORMAL: 1
METHOD: COUNTING FINGERS
OD_NORMAL: 1

## 2020-08-06 ASSESSMENT — VISUAL ACUITY
OS_CC: 20/20
OD_CC: 20/25
OS_SC: 20/20
OS_CC: 20/20
OD_SC: 20/40
OD_SC+: -1
OD_CC: 20/30-1
METHOD: SNELLEN - LINEAR
OD_CC+: -1
CORRECTION_TYPE: GLASSES

## 2020-08-06 ASSESSMENT — SLIT LAMP EXAM - LIDS: COMMENTS: POOR PUNCTAL APPOSITION LL

## 2020-08-06 ASSESSMENT — CUP TO DISC RATIO
OS_RATIO: 0.25
OD_RATIO: 0.2

## 2020-08-06 ASSESSMENT — EXTERNAL EXAM - RIGHT EYE: OD_EXAM: NORMAL

## 2020-08-06 ASSESSMENT — EXTERNAL EXAM - LEFT EYE: OS_EXAM: NORMAL

## 2020-08-06 NOTE — PATIENT INSTRUCTIONS
You have the start of mild cataracts.  You may notice some blurred vision or glare with night driving.  It is important that you wear good sunglasses to protect your eyes from the ultraviolet light from the sun.     Updated glasses prescription provided today; mild change each eye.     Return in 1 year for comprehensive eye exam, or sooner if needed.     The effects of the dilating drops last for 4- 6 hours.  You will be more sensitive to light and vision will be blurry up close.  Mydriatic sunglasses were given if needed.    Hu Roa, OD  [facility]  [place]  [address]    [phone]

## 2021-04-27 ENCOUNTER — IMMUNIZATION (OUTPATIENT)
Dept: NURSING | Facility: CLINIC | Age: 61
End: 2021-04-27
Payer: COMMERCIAL

## 2021-04-27 PROCEDURE — 91300 PR COVID VAC PFIZER DIL RECON 30 MCG/0.3 ML IM: CPT

## 2021-04-27 PROCEDURE — 0001A PR COVID VAC PFIZER DIL RECON 30 MCG/0.3 ML IM: CPT

## 2021-05-18 ENCOUNTER — IMMUNIZATION (OUTPATIENT)
Dept: NURSING | Facility: CLINIC | Age: 61
End: 2021-05-18
Attending: FAMILY MEDICINE
Payer: COMMERCIAL

## 2021-05-18 PROCEDURE — 0002A PR COVID VAC PFIZER DIL RECON 30 MCG/0.3 ML IM: CPT

## 2021-05-18 PROCEDURE — 91300 PR COVID VAC PFIZER DIL RECON 30 MCG/0.3 ML IM: CPT

## 2021-06-06 ENCOUNTER — HEALTH MAINTENANCE LETTER (OUTPATIENT)
Age: 61
End: 2021-06-06

## 2021-09-26 ENCOUNTER — HEALTH MAINTENANCE LETTER (OUTPATIENT)
Age: 61
End: 2021-09-26

## 2022-07-02 ENCOUNTER — HEALTH MAINTENANCE LETTER (OUTPATIENT)
Age: 62
End: 2022-07-02

## 2022-08-22 ENCOUNTER — OFFICE VISIT (OUTPATIENT)
Dept: OPTOMETRY | Facility: CLINIC | Age: 62
End: 2022-08-22
Payer: COMMERCIAL

## 2022-08-22 DIAGNOSIS — H25.13 NUCLEAR AGE-RELATED CATARACT, BOTH EYES: ICD-10-CM

## 2022-08-22 DIAGNOSIS — H52.4 PRESBYOPIA: ICD-10-CM

## 2022-08-22 DIAGNOSIS — H52.02 HYPERMETROPIA, LEFT: ICD-10-CM

## 2022-08-22 DIAGNOSIS — H52.11 MYOPIA, RIGHT: ICD-10-CM

## 2022-08-22 DIAGNOSIS — H52.223 REGULAR ASTIGMATISM OF BOTH EYES: ICD-10-CM

## 2022-08-22 DIAGNOSIS — Z01.01 ENCOUNTER FOR EXAMINATION OF EYES AND VISION WITH ABNORMAL FINDINGS: Primary | ICD-10-CM

## 2022-08-22 PROCEDURE — 92015 DETERMINE REFRACTIVE STATE: CPT | Performed by: OPTOMETRIST

## 2022-08-22 PROCEDURE — 92014 COMPRE OPH EXAM EST PT 1/>: CPT | Performed by: OPTOMETRIST

## 2022-08-22 ASSESSMENT — VISUAL ACUITY
OD_CC: 20/20
METHOD: SNELLEN - LINEAR
OS_CC: 20/25
OS_SC: 20/400
CORRECTION_TYPE: GLASSES
OD_SC+: +1
OD_SC: 20/80
OD_SC: 20/200
OS_SC: 20/70
OD_CC: 20/20
OD_CC+: -1
OS_SC+: +1
OS_CC: 20/20

## 2022-08-22 ASSESSMENT — REFRACTION_MANIFEST
OS_CYLINDER: +0.75
OD_AXIS: 007
OD_CYLINDER: +2.00
OD_SPHERE: -0.75
OS_SPHERE: +0.50
OS_ADD: +2.50
OS_AXIS: 130
OD_ADD: +2.50

## 2022-08-22 ASSESSMENT — EXTERNAL EXAM - LEFT EYE: OS_EXAM: NORMAL

## 2022-08-22 ASSESSMENT — TONOMETRY
OD_IOP_MMHG: 14
IOP_METHOD: APPLANATION
OS_IOP_MMHG: 14

## 2022-08-22 ASSESSMENT — CONF VISUAL FIELD
METHOD: COUNTING FINGERS
OD_NORMAL: 1
OS_NORMAL: 1

## 2022-08-22 ASSESSMENT — REFRACTION_WEARINGRX
OS_ADD: +2.50
OS_CYLINDER: +1.00
OD_CYLINDER: +2.00
OD_ADD: +2.50
SPECS_TYPE: BIFOCAL
OS_SPHERE: +0.25
OD_SPHERE: -1.00
OD_AXIS: 007
OS_AXIS: 142

## 2022-08-22 ASSESSMENT — CUP TO DISC RATIO
OS_RATIO: 0.3
OD_RATIO: 0.25

## 2022-08-22 ASSESSMENT — EXTERNAL EXAM - RIGHT EYE: OD_EXAM: NORMAL

## 2022-08-22 NOTE — LETTER
8/22/2022         RE: Darrius Goldman  [address]  [facility] [place] [facility] [address]        Dear Colleague,    Thank you for referring your patient, Darrius Goldman, to the [facility]. Please see a copy of my visit note below.    Chief Complaint   Patient presents with     Annual Eye Exam         -No  needed    Last Eye Exam: 8/6/2020  Dilated Previously: Yes, side effects of dilation explained today    What are you currently using to see?  Glasses - wears full time       Distance Vision Acuity: Satisfied with vision    Near Vision Acuity: Satisfied with vision while reading and using computer with glasses    Eye Comfort: good  Do you use eye drops? : No  Occupation or Hobbies: Part time [employer]        Medical, surgical and family histories reviewed and updated 8/22/2022.       OBJECTIVE: See Ophthalmology exam    ASSESSMENT:    ICD-10-CM    1. Encounter for examination of eyes and vision with abnormal findings  Z01.01    2. Nuclear age-related cataract, both eyes  H25.13    3. Myopia, right  H52.11    4. Hypermetropia, left  H52.02    5. Regular astigmatism of both eyes  H52.223    6. Presbyopia  H52.4        PLAN:     Patient Instructions   You have the start of mild cataracts.  You may notice some blurred vision or glare with night driving.  It is important that you wear good sunglasses to protect your eyes from the ultraviolet light from the sun.     Darrius was advised of today's exam findings.  Fill glasses prescription  Allow 2 weeks to adapt to change in glasses  Wear glasses full time  Copy of glasses Rx provided today.    Return in 1 year for eye exam, or sooner if needed.    The effects of the dilating drops last for 4- 6 hours.  You will be more sensitive to light and vision will be blurry up close.  Mydriatic sunglasses were given if needed.       Hu Roa O.D.  [facility]  [address] [place] [address]. [place]  [address] [phone]  [phone]             Again, thank you for allowing me to participate in the care of your patient.        Sincerely,        Hu Roa OD

## 2022-08-22 NOTE — PROGRESS NOTES
Chief Complaint   Patient presents with     Annual Eye Exam         -No  needed    Last Eye Exam: 8/6/2020  Dilated Previously: Yes, side effects of dilation explained today    What are you currently using to see?  Glasses - wears full time       Distance Vision Acuity: Satisfied with vision    Near Vision Acuity: Satisfied with vision while reading and using computer with glasses    Eye Comfort: good  Do you use eye drops? : No  Occupation or Hobbies: Part time [employer]        Medical, surgical and family histories reviewed and updated 8/22/2022.       OBJECTIVE: See Ophthalmology exam    ASSESSMENT:    ICD-10-CM    1. Encounter for examination of eyes and vision with abnormal findings  Z01.01    2. Nuclear age-related cataract, both eyes  H25.13    3. Myopia, right  H52.11    4. Hypermetropia, left  H52.02    5. Regular astigmatism of both eyes  H52.223    6. Presbyopia  H52.4        PLAN:     Patient Instructions   You have the start of mild cataracts.  You may notice some blurred vision or glare with night driving.  It is important that you wear good sunglasses to protect your eyes from the ultraviolet light from the sun.     Mensur was advised of today's exam findings.  Fill glasses prescription  Allow 2 weeks to adapt to change in glasses  Wear glasses full time  Copy of glasses Rx provided today.    Return in 1 year for eye exam, or sooner if needed.    The effects of the dilating drops last for 4- 6 hours.  You will be more sensitive to light and vision will be blurry up close.  Mydriatic sunglasses were given if needed.       Hu Roa O.D.  [address] [facility] [place]  [address]    [phone]

## 2022-08-22 NOTE — PATIENT INSTRUCTIONS
You have the start of mild cataracts.  You may notice some blurred vision or glare with night driving.  It is important that you wear good sunglasses to protect your eyes from the ultraviolet light from the sun.     Darrius was advised of today's exam findings.  Fill glasses prescription  Allow 2 weeks to adapt to change in glasses  Wear glasses full time  Copy of glasses Rx provided today.    Return in 1 year for eye exam, or sooner if needed.    The effects of the dilating drops last for 4- 6 hours.  You will be more sensitive to light and vision will be blurry up close.  Mydriatic sunglasses were given if needed.       Hu Roa O.D.  [facility]  [address] [place] [address]. [place]  [address]    [phone]

## 2022-09-01 ENCOUNTER — APPOINTMENT (OUTPATIENT)
Dept: OPTOMETRY | Facility: CLINIC | Age: 62
End: 2022-09-01
Payer: COMMERCIAL

## 2022-09-01 PROCEDURE — 92341 FIT SPECTACLES BIFOCAL: CPT | Performed by: OPTOMETRIST

## 2023-04-23 ENCOUNTER — HEALTH MAINTENANCE LETTER (OUTPATIENT)
Age: 63
End: 2023-04-23

## 2023-07-15 ENCOUNTER — HEALTH MAINTENANCE LETTER (OUTPATIENT)
Age: 63
End: 2023-07-15

## 2024-08-22 ENCOUNTER — OFFICE VISIT (OUTPATIENT)
Dept: OPTOMETRY | Facility: CLINIC | Age: 64
End: 2024-08-22
Payer: COMMERCIAL

## 2024-08-22 DIAGNOSIS — Z01.01 ENCOUNTER FOR EXAMINATION OF EYES AND VISION WITH ABNORMAL FINDINGS: Primary | ICD-10-CM

## 2024-08-22 DIAGNOSIS — H25.13 NUCLEAR AGE-RELATED CATARACT, BOTH EYES: ICD-10-CM

## 2024-08-22 DIAGNOSIS — H52.4 PRESBYOPIA: ICD-10-CM

## 2024-08-22 DIAGNOSIS — H52.223 REGULAR ASTIGMATISM OF BOTH EYES: ICD-10-CM

## 2024-08-22 PROCEDURE — 92015 DETERMINE REFRACTIVE STATE: CPT | Performed by: OPTOMETRIST

## 2024-08-22 PROCEDURE — 92014 COMPRE OPH EXAM EST PT 1/>: CPT | Performed by: OPTOMETRIST

## 2024-08-22 ASSESSMENT — CONF VISUAL FIELD
METHOD: COUNTING FINGERS
OS_SUPERIOR_NASAL_RESTRICTION: 0
OD_NORMAL: 1
OS_INFERIOR_TEMPORAL_RESTRICTION: 0
OD_SUPERIOR_TEMPORAL_RESTRICTION: 0
OD_INFERIOR_TEMPORAL_RESTRICTION: 0
OS_NORMAL: 1
OD_SUPERIOR_NASAL_RESTRICTION: 0
OS_SUPERIOR_TEMPORAL_RESTRICTION: 0
OS_INFERIOR_NASAL_RESTRICTION: 0
OD_INFERIOR_NASAL_RESTRICTION: 0

## 2024-08-22 ASSESSMENT — VISUAL ACUITY
OD_CC: 20/30-2
OS_SC: 20/70
OS_CC: 20/30
OD_CC+: +1
OD_SC+: -1
METHOD: SNELLEN - LINEAR
CORRECTION_TYPE: GLASSES
OS_CC: 20/20-2
OD_CC: 20/30
OS_SC+: +2
OS_CC+: -1
OS_SC: 20/400
OD_SC: 20/200
OD_SC: 20/70

## 2024-08-22 ASSESSMENT — CUP TO DISC RATIO
OS_RATIO: 0.25
OD_RATIO: 0.3

## 2024-08-22 ASSESSMENT — REFRACTION_WEARINGRX
OD_AXIS: 007
OS_SPHERE: +0.50
SPECS_TYPE: BIFOCAL
OD_CYLINDER: +2.00
OS_AXIS: 130
OS_ADD: +2.50
OD_ADD: +2.50
OS_CYLINDER: +0.75
OD_SPHERE: -0.75

## 2024-08-22 ASSESSMENT — REFRACTION_MANIFEST
OS_ADD: +2.50
OD_SPHERE: -0.75
OS_SPHERE: +0.50
OD_ADD: +2.50
OD_CYLINDER: +2.00
OS_AXIS: 120
OD_AXIS: 005
OS_CYLINDER: +0.75

## 2024-08-22 ASSESSMENT — TONOMETRY
OS_IOP_MMHG: 16
OD_IOP_MMHG: 18
IOP_METHOD: APPLANATION

## 2024-08-22 ASSESSMENT — EXTERNAL EXAM - LEFT EYE: OS_EXAM: NORMAL

## 2024-08-22 ASSESSMENT — EXTERNAL EXAM - RIGHT EYE: OD_EXAM: NORMAL

## 2024-08-22 NOTE — LETTER
8/22/2024      Darrius Goldman  [address]  [facility] [address]      Dear Colleague,    Thank you for referring your patient, Darrius Goldman, to the [facility]. Please see a copy of my visit note below.    Chief Complaint   Patient presents with     Annual Eye Exam     Cataract      -Cataracts each eye      Last Eye Exam: 8/22/2022  Dilated Previously: Yes, side effects of dilation explained today    What are you currently using to see?  Glasses - FT BF - wears full time        Distance Vision Acuity: Noticed gradual change in both eyes    Near Vision Acuity: Not satisfied     Eye Comfort: good  Do you use eye drops? : No  Occupation or Hobbies: Part time - construction     Sara Santizo  Optometry Assistant        Medical, surgical and family histories reviewed and updated 8/22/2024.       OBJECTIVE: See Ophthalmology exam    ASSESSMENT:    ICD-10-CM    1. Encounter for examination of eyes and vision with abnormal findings  Z01.01       2. Nuclear age-related cataract, both eyes  H25.13       3. Regular astigmatism of both eyes  H52.223       4. Presbyopia  H52.4           PLAN:     Patient Instructions   You have the start of mild cataracts.  You may notice some blurred vision or glare with night driving.  It is important that you wear good sunglasses to protect your eyes from the ultraviolet light from the sun.     Updated glasses prescription provided today. Stable prescription, optional to fill.     Return in 1 year for a comprehensive eye exam, or sooner if needed.      The effects of the dilating drops last for 4- 6 hours.  You will be more sensitive to light and vision will be blurry up close.  Mydriatic sunglasses were given if needed.     Hu Roa, OD  [facility]  [address] [facility]  [place]  [address]    [phone]       Again, thank you for allowing me to participate in the care of your patient.        Sincerely,        Hu Roa,  OD

## 2024-08-22 NOTE — PROGRESS NOTES
Chief Complaint   Patient presents with    Annual Eye Exam    Cataract      -Cataracts each eye      Last Eye Exam: 8/22/2022  Dilated Previously: Yes, side effects of dilation explained today    What are you currently using to see?  Glasses - FT BF - wears full time        Distance Vision Acuity: Noticed gradual change in both eyes    Near Vision Acuity: Not satisfied     Eye Comfort: good  Do you use eye drops? : No  Occupation or Hobbies: Part time - construction     Sara Santizo  Optometry Assistant        Medical, surgical and family histories reviewed and updated 8/22/2024.       OBJECTIVE: See Ophthalmology exam    ASSESSMENT:    ICD-10-CM    1. Encounter for examination of eyes and vision with abnormal findings  Z01.01       2. Nuclear age-related cataract, both eyes  H25.13       3. Regular astigmatism of both eyes  H52.223       4. Presbyopia  H52.4           PLAN:     Patient Instructions   You have the start of mild cataracts.  You may notice some blurred vision or glare with night driving.  It is important that you wear good sunglasses to protect your eyes from the ultraviolet light from the sun.     Updated glasses prescription provided today. Stable prescription, optional to fill.     Return in 1 year for a comprehensive eye exam, or sooner if needed.      The effects of the dilating drops last for 4- 6 hours.  You will be more sensitive to light and vision will be blurry up close.  Mydriatic sunglasses were given if needed.     Hu Roa, OD  [facility]  [address] [place] [address]. [place]  [place]  [address]    [phone]

## 2024-08-22 NOTE — PATIENT INSTRUCTIONS
You have the start of mild cataracts.  You may notice some blurred vision or glare with night driving.  It is important that you wear good sunglasses to protect your eyes from the ultraviolet light from the sun.     Updated glasses prescription provided today. Stable prescription, optional to fill.     Return in 1 year for a comprehensive eye exam, or sooner if needed.      The effects of the dilating drops last for 4- 6 hours.  You will be more sensitive to light and vision will be blurry up close.  Mydriatic sunglasses were given if needed.     Hu Roa, OD  [facility]  [address] [place] [address]. [place]  [place]  [address]    [phone]

## 2024-09-07 ENCOUNTER — HEALTH MAINTENANCE LETTER (OUTPATIENT)
Age: 64
End: 2024-09-07

## 2025-02-08 ENCOUNTER — HEALTH MAINTENANCE LETTER (OUTPATIENT)
Age: 65
End: 2025-02-08

## 2025-06-18 ENCOUNTER — OFFICE VISIT (OUTPATIENT)
Dept: FAMILY MEDICINE | Facility: CLINIC | Age: 65
End: 2025-06-18
Payer: COMMERCIAL

## 2025-06-18 VITALS
HEIGHT: 68 IN | SYSTOLIC BLOOD PRESSURE: 161 MMHG | HEART RATE: 85 BPM | TEMPERATURE: 97.8 F | BODY MASS INDEX: 19.76 KG/M2 | RESPIRATION RATE: 16 BRPM | OXYGEN SATURATION: 99 % | DIASTOLIC BLOOD PRESSURE: 89 MMHG | WEIGHT: 130.4 LBS

## 2025-06-18 DIAGNOSIS — C16.9 MALIGNANT NEOPLASM OF STOMACH, UNSPECIFIED LOCATION (H): ICD-10-CM

## 2025-06-18 DIAGNOSIS — Z00.00 ROUTINE GENERAL MEDICAL EXAMINATION AT A HEALTH CARE FACILITY: Primary | ICD-10-CM

## 2025-06-18 DIAGNOSIS — Z13.6 SCREENING FOR CARDIOVASCULAR CONDITION: ICD-10-CM

## 2025-06-18 DIAGNOSIS — R42 DIZZINESS: ICD-10-CM

## 2025-06-18 DIAGNOSIS — Z13.1 SCREENING FOR DIABETES MELLITUS: ICD-10-CM

## 2025-06-18 DIAGNOSIS — Z11.4 SCREENING FOR HIV (HUMAN IMMUNODEFICIENCY VIRUS): ICD-10-CM

## 2025-06-18 DIAGNOSIS — F33.42 RECURRENT MAJOR DEPRESSIVE DISORDER, IN FULL REMISSION: ICD-10-CM

## 2025-06-18 DIAGNOSIS — Z12.11 SCREEN FOR COLON CANCER: ICD-10-CM

## 2025-06-18 DIAGNOSIS — Z11.59 NEED FOR HEPATITIS C SCREENING TEST: ICD-10-CM

## 2025-06-18 DIAGNOSIS — F51.01 PRIMARY INSOMNIA: ICD-10-CM

## 2025-06-18 DIAGNOSIS — I10 PRIMARY HYPERTENSION: ICD-10-CM

## 2025-06-18 PROBLEM — F33.1 MODERATE RECURRENT MAJOR DEPRESSION (H): Status: ACTIVE | Noted: 2025-06-18

## 2025-06-18 PROBLEM — F33.1 MODERATE RECURRENT MAJOR DEPRESSION (H): Status: RESOLVED | Noted: 2025-06-18 | Resolved: 2025-06-18

## 2025-06-18 LAB
ALBUMIN SERPL BCG-MCNC: 4.2 G/DL (ref 3.5–5.2)
ALP SERPL-CCNC: 81 U/L (ref 40–150)
ALT SERPL W P-5'-P-CCNC: 17 U/L (ref 0–70)
ANION GAP SERPL CALCULATED.3IONS-SCNC: 11 MMOL/L (ref 7–15)
AST SERPL W P-5'-P-CCNC: 28 U/L (ref 0–45)
BASOPHILS # BLD AUTO: 0 10E3/UL (ref 0–0.2)
BASOPHILS NFR BLD AUTO: 0 %
BILIRUB SERPL-MCNC: 0.3 MG/DL
BUN SERPL-MCNC: 20.8 MG/DL (ref 8–23)
CALCIUM SERPL-MCNC: 8.9 MG/DL (ref 8.8–10.4)
CHLORIDE SERPL-SCNC: 104 MMOL/L (ref 98–107)
CHOLEST SERPL-MCNC: 178 MG/DL
CREAT SERPL-MCNC: 1.54 MG/DL (ref 0.67–1.17)
EGFRCR SERPLBLD CKD-EPI 2021: 50 ML/MIN/1.73M2
EOSINOPHIL # BLD AUTO: 0.2 10E3/UL (ref 0–0.7)
EOSINOPHIL NFR BLD AUTO: 2 %
ERYTHROCYTE [DISTWIDTH] IN BLOOD BY AUTOMATED COUNT: 15.1 % (ref 10–15)
EST. AVERAGE GLUCOSE BLD GHB EST-MCNC: 105 MG/DL
FASTING STATUS PATIENT QL REPORTED: YES
FASTING STATUS PATIENT QL REPORTED: YES
FERRITIN SERPL-MCNC: 15 NG/ML (ref 31–409)
GLUCOSE SERPL-MCNC: 100 MG/DL (ref 70–99)
HBA1C MFR BLD: 5.3 % (ref 0–5.6)
HCO3 SERPL-SCNC: 23 MMOL/L (ref 22–29)
HCT VFR BLD AUTO: 35.9 % (ref 40–53)
HCV AB SERPL QL IA: NONREACTIVE
HDLC SERPL-MCNC: 55 MG/DL
HGB BLD-MCNC: 10.7 G/DL (ref 13.3–17.7)
HIV 1+2 AB+HIV1 P24 AG SERPL QL IA: NONREACTIVE
IMM GRANULOCYTES # BLD: 0 10E3/UL
IMM GRANULOCYTES NFR BLD: 0 %
IRON BINDING CAPACITY (ROCHE): 410 UG/DL (ref 240–430)
IRON SATN MFR SERPL: 7 % (ref 15–46)
IRON SERPL-MCNC: 27 UG/DL (ref 61–157)
LDLC SERPL CALC-MCNC: 99 MG/DL
LYMPHOCYTES # BLD AUTO: 2 10E3/UL (ref 0.8–5.3)
LYMPHOCYTES NFR BLD AUTO: 28 %
MCH RBC QN AUTO: 25 PG (ref 26.5–33)
MCHC RBC AUTO-ENTMCNC: 29.8 G/DL (ref 31.5–36.5)
MCV RBC AUTO: 84 FL (ref 78–100)
MONOCYTES # BLD AUTO: 0.6 10E3/UL (ref 0–1.3)
MONOCYTES NFR BLD AUTO: 8 %
NEUTROPHILS # BLD AUTO: 4.4 10E3/UL (ref 1.6–8.3)
NEUTROPHILS NFR BLD AUTO: 61 %
NONHDLC SERPL-MCNC: 123 MG/DL
PLATELET # BLD AUTO: 296 10E3/UL (ref 150–450)
POTASSIUM SERPL-SCNC: 4.5 MMOL/L (ref 3.4–5.3)
PROT SERPL-MCNC: 7.2 G/DL (ref 6.4–8.3)
RBC # BLD AUTO: 4.28 10E6/UL (ref 4.4–5.9)
SODIUM SERPL-SCNC: 138 MMOL/L (ref 135–145)
TRIGL SERPL-MCNC: 119 MG/DL
TSH SERPL DL<=0.005 MIU/L-ACNC: 3.31 UIU/ML (ref 0.3–4.2)
VIT B12 SERPL-MCNC: 443 PG/ML (ref 232–1245)
VIT D+METAB SERPL-MCNC: 39 NG/ML (ref 20–50)
WBC # BLD AUTO: 7.2 10E3/UL (ref 4–11)

## 2025-06-18 PROCEDURE — 80061 LIPID PANEL: CPT

## 2025-06-18 PROCEDURE — 83036 HEMOGLOBIN GLYCOSYLATED A1C: CPT

## 2025-06-18 PROCEDURE — 99387 INIT PM E/M NEW PAT 65+ YRS: CPT

## 2025-06-18 PROCEDURE — 85025 COMPLETE CBC W/AUTO DIFF WBC: CPT

## 2025-06-18 PROCEDURE — 82728 ASSAY OF FERRITIN: CPT

## 2025-06-18 PROCEDURE — 87389 HIV-1 AG W/HIV-1&-2 AB AG IA: CPT

## 2025-06-18 PROCEDURE — 83540 ASSAY OF IRON: CPT

## 2025-06-18 PROCEDURE — 82306 VITAMIN D 25 HYDROXY: CPT

## 2025-06-18 PROCEDURE — 83550 IRON BINDING TEST: CPT

## 2025-06-18 PROCEDURE — 3079F DIAST BP 80-89 MM HG: CPT

## 2025-06-18 PROCEDURE — 86803 HEPATITIS C AB TEST: CPT

## 2025-06-18 PROCEDURE — 82607 VITAMIN B-12: CPT

## 2025-06-18 PROCEDURE — 3077F SYST BP >= 140 MM HG: CPT

## 2025-06-18 PROCEDURE — 36415 COLL VENOUS BLD VENIPUNCTURE: CPT

## 2025-06-18 PROCEDURE — 80053 COMPREHEN METABOLIC PANEL: CPT

## 2025-06-18 PROCEDURE — 84443 ASSAY THYROID STIM HORMONE: CPT

## 2025-06-18 PROCEDURE — 99214 OFFICE O/P EST MOD 30 MIN: CPT | Mod: 25

## 2025-06-18 PROCEDURE — 3044F HG A1C LEVEL LT 7.0%: CPT

## 2025-06-18 PROCEDURE — G2211 COMPLEX E/M VISIT ADD ON: HCPCS

## 2025-06-18 RX ORDER — TELMISARTAN 40 MG/1
TABLET ORAL
Qty: 57 TABLET | Refills: 0 | Status: SHIPPED | OUTPATIENT
Start: 2025-06-18 | End: 2025-06-19

## 2025-06-18 SDOH — HEALTH STABILITY: PHYSICAL HEALTH: ON AVERAGE, HOW MANY DAYS PER WEEK DO YOU ENGAGE IN MODERATE TO STRENUOUS EXERCISE (LIKE A BRISK WALK)?: 2 DAYS

## 2025-06-18 ASSESSMENT — SOCIAL DETERMINANTS OF HEALTH (SDOH): HOW OFTEN DO YOU GET TOGETHER WITH FRIENDS OR RELATIVES?: TWICE A WEEK

## 2025-06-18 ASSESSMENT — PATIENT HEALTH QUESTIONNAIRE - PHQ9
10. IF YOU CHECKED OFF ANY PROBLEMS, HOW DIFFICULT HAVE THESE PROBLEMS MADE IT FOR YOU TO DO YOUR WORK, TAKE CARE OF THINGS AT HOME, OR GET ALONG WITH OTHER PEOPLE: NOT DIFFICULT AT ALL
SUM OF ALL RESPONSES TO PHQ QUESTIONS 1-9: 4
SUM OF ALL RESPONSES TO PHQ QUESTIONS 1-9: 4

## 2025-06-18 NOTE — PATIENT INSTRUCTIONS
[phone] up with Dr[facility] Chris Goodrich at [facility]   Begin telmisartan as prescribed. Follow up with myself or a nurse visit in 2-3 weeks to recheck blood pressure         Patient Education   Preventive Care Advice   This is general advice given by our system to help you stay healthy. However, your care team may have specific advice just for you. Please talk to your care team about your preventive care needs.  Nutrition  Eat 5 or more servings of fruits and vegetables each day.  Try wheat bread, brown rice and whole grain pasta (instead of white bread, rice, and pasta).  Get enough calcium and vitamin D. Check the label on foods and aim for 100% of the RDA (recommended daily allowance).  Lifestyle  Exercise at least 150 minutes each week  (30 minutes a day, 5 days a week).  Do muscle strengthening activities 2 days a week. These help control your weight and prevent disease.  No smoking.  Wear sunscreen to prevent skin cancer.  Have a dental exam and cleaning every 6 months.  Yearly exams  See your health care team every year to talk about:  Any changes in your health.  Any medicines your care team has prescribed.  Preventive care, family planning, and ways to prevent chronic diseases.  Shots (vaccines)   HPV shots (up to age 26), if you've never had them before.  Hepatitis B shots (up to age 59), if you've never had them before.  COVID-19 shot: Get this shot when it's due.  Flu shot: Get a flu shot every year.  Tetanus shot: Get a tetanus shot every 10 years.  Pneumococcal, hepatitis A, and RSV shots: Ask your care team if you need these based on your risk.  Shingles shot (for age 50 and up)  General health tests  Diabetes screening:  Starting at age 35, Get screened for diabetes at least every 3 years.  If you are younger than age 35, ask your care team if you should be screened for diabetes.  Cholesterol test: At age 39, start having a cholesterol test every 5 years, or  Care Management Initial Consult    General Information  Assessment completed with: Pt's healthcare agent/ Son in law, Erv.       Primary Care Provider verified and updated as needed:   Not updated yet.  Readmission within the last 30 days: Pt was last hospitalized in June of 2022.          Advance Care Planning: Advance Care Planning Reviewed: present on chart          Communication Assessment  Patient's communication style: spoken language (English or Bilingual)    Hearing Difficulty or Deaf: no   Wear Glasses or Blind: yes    Cognitive  Cognitive/Neuro/Behavioral: .WDL except  Level of Consciousness: sedated  Arousal Level: opens eyes spontaneously  Orientation: other (see comments) (DAYRON)  Mood/Behavior: calm, cooperative  Best Language:  (DAYRON)  Speech: endotracheal tube    Living Environment:   People in home: alone     Current living Arrangements: condominium      Able to return to prior arrangements:  (Not likely in the near future.)       Family/Social Support:  Care provided by: other (see comments) (PCA, 2-3 hours per day)- Privately pays a friend to assist him with his ADLs in the morning and at night. This friend also drives him to the store as needed.   Provides care for: no one  Marital Status:   PCA, Other (specify) (Son-in-law and grandkids)          Description of Support System: Involved, Supportive    Support Assessment: Adequate social supports, Lacks adequate physical care.    Current Resources:   Patient receiving home care services: No     Community Resources:  (Pt has a personal friend he pays privatly to help him with personal cares.)  Equipment currently used at home: walker, rolling  Supplies currently used at home:  None.    Employment/Financial:  Employment Status: retired        Financial Concerns: No concerns identified. Pt has adequate help to afford his OOP costs.     Finance Comments: Per family, Pt may need a conservator to help him manage, depending on his level of  more often if advised.  Bone density scan (DEXA): At age 50, ask your care team if you should have this scan for osteoporosis (brittle bones).  Hepatitis C: Get tested at least once in your life.  STIs (sexually transmitted infections)  Before age 24: Ask your care team if you should be screened for STIs.  After age 24: Get screened for STIs if you're at risk. You are at risk for STIs (including HIV) if:  You are sexually active with more than one person.  You don't use condoms every time.  You or a partner was diagnosed with a sexually transmitted infection.  If you are at risk for HIV, ask about PrEP medicine to prevent HIV.  Get tested for HIV at least once in your life, whether you are at risk for HIV or not.  Cancer screening tests  Cervical cancer screening: If you have a cervix, begin getting regular cervical cancer screening tests starting at age 21.  Breast cancer scan (mammogram): If you've ever had breasts, begin having regular mammograms starting at age 40. This is a scan to check for breast cancer.  Colon cancer screening: It is important to start screening for colon cancer at age 45.  Have a colonoscopy test every 10 years (or more often if you're at risk) Or, ask your provider about stool tests like a FIT test every year or Cologuard test every 3 years.  To learn more about your testing options, visit:   .  For help making a decision, visit:   https://bit.ly/qq22792.  Prostate cancer screening test: If you have a prostate, ask your care team if a prostate cancer screening test (PSA) at age 55 is right for you.  Lung cancer screening: If you are a current or former smoker ages 50 to 80, ask your care team if ongoing lung cancer screenings are right for you.  For informational purposes only. Not to replace the advice of your health care provider. Copyright   2023 [email or web address]. All rights reserved. Clinically reviewed by the [facility]. [employer] 504137 - REV  recovery.    Lifestyle & Psychosocial Needs:  Social Determinants of Health     Tobacco Use: Medium Risk     Smoking Tobacco Use: Former Smoker     Smokeless Tobacco Use: Never Used   Alcohol Use: Not on file   Financial Resource Strain: Not on file   Food Insecurity: Not on file   Transportation Needs: Not on file   Physical Activity: Not on file   Stress: Not on file   Social Connections: Not on file   Intimate Partner Violence: Not on file   Depression: Not on file   Housing Stability: Not on file       Functional Status:  Prior to admission patient needed assistance:   Dependent ADLs:: Bathing, Dressing, Grooming, Incontinence  Dependent IADLs:: Cleaning, Shopping, Transportation, Incontinence  Assesssment of Functional Status: Not at  functional baseline    Mental Health Status:  Mental Health Status: No Current Concerns       Chemical Dependency Status:  Chemical Dependency Status: No Current Concerns             Values/Beliefs:  Spiritual, Cultural Beliefs, Gnosticism Practices, Values that affect care: no       Cultural/Gnosticism Practices Patient Routinely Participates In: other (see comments)       Additional Information:  Pt was living in his own condo when he recently fell, which has resulted in this hospitalization. Per discussion with his son-in-law, Abhijit, he left the [facility]CU he had been cared for at earlier this summer with a promise that he was going home with 24/hr care by his privately hired PCA, however this was not the case. His care attendant does NOT provide 24 hour care for him. He spends the day alone despite not being well enough to be without assistance. This was Alexandru's choice despite strong medical advice to the contrary. Writer explained to Abhijit that Social Work will continue to assist with discharge planning as Alexandru recovers from his injuries. He will very likely need placement in a skilled nursing facility. The patient was made aware of this by the medical team before intubation.      Bronwyn Pavon, MSW   Weekend SW  [phone]       01/24.  Preventing Falls: Care Instructions  Injuries and health problems such as trouble walking or poor eyesight can increase your risk of falling. So can some medicines. But there are things you can do to help prevent falls. You can exercise to get stronger. You can also arrange your home to make it safer.    Talk to your doctor about the medicines you take. Ask if any of them increase the risk of falls and whether they can be changed or stopped.   Try to exercise regularly. It can help improve your strength and balance. This can help lower your risk of falling.         Practice fall safety and prevention.   Wear low-heeled shoes that fit well and give your feet good support. Talk to your doctor if you have foot problems that make this hard.  Carry a cellphone or wear a medical alert device that you can use to call for help.  Use stepladders instead of chairs to reach high objects. Don't climb if you're at risk for falls. Ask for help, if needed.  Wear the correct eyeglasses, if you need them.        Make your home safer.   Remove rugs, cords, clutter, and furniture from walkways.  Keep your house well lit. Use night-lights in hallways and bathrooms.  Install and use sturdy handrails on stairways.  Wear nonskid footwear, even inside. Don't walk barefoot or in socks without shoes.        Be safe outside.   Use handrails, curb cuts, and ramps whenever possible.  Keep your hands free by using a shoulder bag or backpack.  Try to walk in well-lit areas. Watch out for uneven ground, changes in pavement, and debris.  Be careful in the winter. Walk on the grass or gravel when sidewalks are slippery. Use de-icer on steps and walkways. Add non-slip devices to shoes.    Put grab bars and nonskid mats in your shower or tub and near the toilet. Try to use a shower chair or bath bench when bathing.   Get into a tub or shower by putting in your weaker leg first. Get out with your strong side first. Have a phone or medical alert  "device in the bathroom with you.   Where can you learn more?  Go to https://www.[employer].net/patiented  Enter G117 in the search box to learn more about \"Preventing Falls: Care Instructions.\"  Current as of: July 31, 2024  Content Version: 14.5 2024-2025 [employer].   Care instructions adapted under license by your healthcare professional. If you have questions about a medical condition or this instruction, always ask your healthcare professional. [employer] disclaims any warranty or liability for your use of this information.    Learning About Stress  What is stress?     Stress is your body's response to a hard situation. Your body can have a physical, emotional, or mental response. Stress is a fact of life for most people, and it affects everyone differently. What causes stress for you may not be stressful for someone else.  A lot of things can cause stress. You may feel stress when you go on a job interview, take a test, or run a race. This kind of short-term stress is normal and even useful. It can help you if you need to work hard or react quickly. For example, stress can help you finish an important job on time.  Long-term stress is caused by ongoing stressful situations or events. Examples of long-term stress include long-term health problems, ongoing problems at work, or conflicts in your family. Long-term stress can harm your health.  How does stress affect your health?  When you are stressed, your body responds as though you are in danger. It makes hormones that speed up your heart, make you breathe faster, and give you a burst of energy. This is called the fight-or-flight stress response. If the stress is over quickly, your body goes back to normal and no harm is done.  But if stress happens too often or lasts too long, it can have bad effects. Long-term stress can make you more likely to get sick, and it can make symptoms of some diseases worse. If you tense up when you are " stressed, you may develop neck, shoulder, or low back pain. Stress is linked to high blood pressure and heart disease.  Stress also harms your emotional health. It can make you garrett, tense, or depressed. Your relationships may suffer, and you may not do well at work or school.  What can you do to manage stress?  You can try these things to help manage stress:   Do something active. Exercise or activity can help reduce stress. Walking is a great way to get started. Even everyday activities such as housecleaning or yard work can help.  Try yoga or rubina chi. These techniques combine exercise and meditation. You may need some training at first to learn them.  Do something you enjoy. For example, listen to music or go to a movie. Practice your hobby or do volunteer work.  Meditate. This can help you relax, because you are not worrying about what happened before or what may happen in the future.  Do guided imagery. Imagine yourself in any setting that helps you feel calm. You can use online videos, books, or a teacher to guide you.  Do breathing exercises. For example:  From a standing position, bend forward from the waist with your knees slightly bent. Let your arms dangle close to the floor.  Breathe in slowly and deeply as you return to a standing position. Roll up slowly and lift your head last.  Hold your breath for just a few seconds in the standing position.  Breathe out slowly and bend forward from the waist.  Let your feelings out. Talk, laugh, cry, and express anger when you need to. Talking with supportive friends or family, a counselor, or a ivory leader about your feelings is a healthy way to relieve stress. Avoid discussing your feelings with people who make you feel worse.  Write. It may help to write about things that are bothering you. This helps you find out how much stress you feel and what is causing it. When you know this, you can find better ways to cope.  What can you do to prevent stress?  You  "might try some of these things to help prevent stress:  Manage your time. This helps you find time to do the things you want and need to do.  Get enough sleep. Your body recovers from the stresses of the day while you are sleeping.  Get support. Your family, friends, and community can make a difference in how you experience stress.  Limit your news feed. Avoid or limit time on social media or news that may make you feel stressed.  Do something active. Exercise or activity can help reduce stress. Walking is a great way to get started.  Where can you learn more?  Go to https://www.[employer]net/patiented  Enter N032 in the search box to learn more about \"Learning About Stress.\"  Current as of: October 24, 2024  Content Version: 14.5 2024-2025 [employer].   Care instructions adapted under license by your healthcare professional. If you have questions about a medical condition or this instruction, always ask your healthcare professional. [employer] disclaims any warranty or liability for your use of this information.       "

## 2025-06-18 NOTE — PROGRESS NOTES
Preventive Care Visit  [facility] CNP, Family Medicine  Jun 18, 2025      Assessment & Plan     Routine general medical examination at a health care facility    Dizziness  Concern for HTN VS BPPV VS GIACOMO given hx  - CBC with Platelets & Differential  - TSH WITH FREE T4 REFLEX  - Vitamin D Deficiency  - Vitamin B12    Malignant neoplasm of stomach, unspecified location (H)  Reestablish care  - Adult Oncology/Hematology  Referral    Primary hypertension  Begin  - telmisartan (MICARDIS) 40 MG tablet  Dispense: 57 tablet; Refill: 0    Primary insomnia  - melatonin 3 MG tablet  Dispense: 30 tablet; Refill: 0    Screening for diabetes mellitus  - Hemoglobin A1c  - Comprehensive metabolic panel    Screening for cardiovascular condition  - Lipid panel reflex to direct LDL Non-fasting    Screen for colon cancer  - Colonoscopy Screening  Referral    Screening for HIV (human immunodeficiency virus)  - HIV Antigen Antibody Combo    Need for hepatitis C screening test  - Hepatitis C Screen Reflex to HCV RNA Quant and Genotype    Recurrent major depressive disorder, in full remission  Well controlled       Counseling  Appropriate preventive services were addressed with this patient via screening, questionnaire, or discussion as appropriate for fall prevention, nutrition, physical activity, Tobacco-use cessation, social engagement, weight loss and cognition.  Checklist reviewing preventive services available has been given to the patient.  Reviewed patient's diet, addressing concerns and/or questions.   He is at risk for lack of exercise and has been provided with information to increase physical activity for the benefit of his well-being.   He is at risk for psychosocial distress and has been provided with information to reduce risk.       Follow up for BP check and ongoing POC development       Subjective   Mensur is a 64 year old, presenting for the following:  Physical  (Annual /6 months of dizziness )    Patient reports for routine annual and to reestablish care.  Has not had access to care in several years.  Largely doing well.       Dizziness  For the past six months, has experienced dizziness characterized by a sensation of pressure in the head. The dizziness occurs when changing positions, such as standing up, rolling over in bed, or after eating and standing up. The episodes are brief, lasting only a moment, and do not involve a spinning sensation. No previous occurrences of similar symptoms were reported. No hearing or visual changes. No LOC. No known prior injuries    Cancer History  Is a cancer survivor, having been declared cancer-free 15 years ago. Underwent chemotherapy in 2011 and has not seen the oncology team since 2018. Reports a history of iron deficiency following surgery approximately four years ago. Was advised to maintain yearly follow ups and is agreeable to reestablishing with previous oncologist who he enjoyed working with    Sleep Issues  Reports intermittent sleep disturbances , with difficulty maintaining sleep for more than three hours at a time. Despite these issues, does not feel tired in the morning. Occurs several times per week but is not worsening.     Blood Pressure  Acutely hypertensive on initial exam, this did not improve on recheck.  Patient reports he has not medications for this in the past.  He is agreeable to initiation at this time. RBA discussed. All questions answered                 6/18/2025     8:43 AM   Additional Questions   Roomed by arcadio      Advance Care Planning    Advance care planning document is on file but is outdated.  Patient encouraged to update or provider to update POLST.        6/18/2025   General Health   How would you rate your overall physical health? Good   Feel stress (tense, anxious, or unable to sleep) To some extent         6/18/2025   Nutrition   Diet: I don't know         6/18/2025   Exercise   Days per  week of moderate/strenous exercise 2 days         6/18/2025   Social Factors   Frequency of gathering with friends or relatives Twice a week   Worry food won't last until get money to buy more No   Food not last or not have enough money for food? No   Do you have housing? (Housing is defined as stable permanent housing and does not include staying outside in a car, in a tent, in an abandoned building, in an overnight shelter, or couch-surfing.) Yes   Are you worried about losing your housing? No   Lack of transportation? No   Unable to get utilities (heat,electricity)? No         6/18/2025   Fall Risk   Fallen 2 or more times in the past year? No   Trouble with walking or balance? Yes   Reason Gait Speed Test Not Completed Patient declines   Reason for decline states that he doesnt have issues with walking nor balance          6/18/2025   Activities of Daily Living- Home Safety   Needs help with the following daily activites None of the above   Safety concerns in the home None of the above         6/18/2025   Dental   Dentist two times every year? Yes         6/18/2025   Hearing Screening   Hearing concerns? None of the above         6/18/2025   Driving Risk Screening   Patient/family members have concerns about driving No         6/18/2025   General Alertness/Fatigue Screening   Have you been more tired than usual lately? No         6/18/2025   Urinary Incontinence Screening   Bothered by leaking urine in past 6 months No       Today's PHQ-9 Score:       6/18/2025     8:29 AM   PHQ-9 SCORE   PHQ-9 Total Score MyChart 4 (Minimal depression)   PHQ-9 Total Score 4        Patient-reported         6/18/2025   Substance Use   Alcohol more than 3/day or more than 7/wk No   Do you have a current opioid prescription? No   How severe/bad is pain from 1 to 10? 0/10 (No Pain)   Do you use any other substances recreationally? No     Social History     Tobacco Use    Smoking status: Never    Smokeless tobacco: Never   Substance  "Use Topics    Alcohol use: No     Comment: sober 2000, prior occasional use    Drug use: No           6/18/2025   AAA Screening   Family history of Abdominal Aortic Aneurysm (AAA)? No         6/18/2025   One time HIV Screening   Previous HIV test? Yes   Last PSA:   PSA   Date Value Ref Range Status   05/20/2016 1.48 0 - 4 ug/L Final     ASCVD Risk   The ASCVD Risk score (Amanda ARORA, et al., 2019) failed to calculate for the following reasons:    Cannot find a previous HDL lab    Cannot find a previous total cholesterol lab    Current providers sharing in care for this patient include:  Patient Care Team:  No Ref-Primary, Physician as PCP - General  Hu Roa OD as Assigned Surgical Provider    The following health maintenance items are reviewed in Epic and correct as of today:  Health Maintenance   Topic Date Due    ANNUAL REVIEW OF  ORDERS  Never done    ADVANCE CARE PLANNING  Never done    DEPRESSION ACTION PLAN  Never done    COLORECTAL CANCER SCREENING  Never done    HIV SCREENING  Never done    HEPATITIS C SCREENING  Never done    PNEUMOCOCCAL VACCINE 50+ YEARS (1 of 1 - PCV) Never done    ZOSTER VACCINE (1 of 2) Never done    DIABETES SCREENING  06/16/2019    LIPID  05/20/2021    COVID-19 VACCINE (3 - 2024-25 season) 09/01/2024    MEDICARE ANNUAL WELLNESS VISIT  12/13/2024    EYE EXAM  08/22/2025    INFLUENZA VACCINE (Season Ended) 09/01/2025    DTAP/TDAP/TD VACCINE (2 - Td or Tdap) 05/20/2026    FALL RISK ASSESSMENT  06/18/2026    PHQ-9  06/18/2026    RSV VACCINE (1 - 1-dose 75+ series) 12/13/2034    HPV VACCINE  Aged Out    MENINGITIS VACCINE  Aged Out            Objective    Exam  BP (!) 161/89   Pulse 85   Temp 97.8  F (36.6  C) (Temporal)   Resp 16   Ht 1.72 m (5' 7.72\")   Wt 59.1 kg (130 lb 6.4 oz)   SpO2 99%   BMI 19.99 kg/m     Estimated body mass index is 19.99 kg/m  as calculated from the following:    Height as of this encounter: 1.72 m (5' 7.72\").    Weight as of this " encounter: 59.1 kg (130 lb 6.4 oz).    Physical Exam  GENERAL: alert and no distress  NECK: no adenopathy, no asymmetry, masses, or scars  RESP: lungs clear to auscultation - no rales, rhonchi or wheezes  CV: regular rate and rhythm, normal S1 S2, no S3 or S4, no murmur, click or rub, no peripheral edema  MS: no gross musculoskeletal defects noted, no edema  Gait and balance assessed per Gait Speed Test.  Result as above.        6/18/2025   Mini Cog   Clock Draw Score 2 Normal   3 Item Recall 2 objects recalled   Mini Cog Total Score 4             Signed Electronically by: SOFI Jones CNP  The longitudinal plan of care for the diagnosis(es)/condition(s) as documented were addressed during this visit. Due to the added complexity in care, I will continue to support Mensur in the subsequent management and with ongoing continuity of care.    Answers submitted by the patient for this visit:  Patient Health Questionnaire (Submitted on 6/18/2025)  If you checked off any problems, how difficult have these problems made it for you to do your work, take care of things at home, or get along with other people?: Not difficult at all  PHQ9 TOTAL SCORE: 4

## 2025-06-19 ENCOUNTER — MYC MEDICAL ADVICE (OUTPATIENT)
Dept: FAMILY MEDICINE | Facility: CLINIC | Age: 65
End: 2025-06-19
Payer: COMMERCIAL

## 2025-06-19 ENCOUNTER — RESULTS FOLLOW-UP (OUTPATIENT)
Dept: FAMILY MEDICINE | Facility: CLINIC | Age: 65
End: 2025-06-19

## 2025-06-19 DIAGNOSIS — D50.9 IRON DEFICIENCY ANEMIA, UNSPECIFIED IRON DEFICIENCY ANEMIA TYPE: Primary | ICD-10-CM

## 2025-06-19 DIAGNOSIS — I10 PRIMARY HYPERTENSION: Primary | ICD-10-CM

## 2025-06-19 DIAGNOSIS — R79.89 ELEVATED SERUM CREATININE: ICD-10-CM

## 2025-06-19 RX ORDER — FERROUS SULFATE 325(65) MG
325 TABLET ORAL
Qty: 90 TABLET | Refills: 0 | Status: SHIPPED | OUTPATIENT
Start: 2025-06-19 | End: 2025-08-20

## 2025-06-19 RX ORDER — VALSARTAN 80 MG/1
TABLET ORAL
Qty: 57 TABLET | Refills: 0 | Status: SHIPPED | OUTPATIENT
Start: 2025-06-19 | End: 2025-08-18

## 2025-07-18 PROBLEM — I10 PRIMARY HYPERTENSION: Status: ACTIVE | Noted: 2025-07-18

## 2025-07-18 PROBLEM — F33.42 RECURRENT MAJOR DEPRESSIVE DISORDER, IN FULL REMISSION: Status: ACTIVE | Noted: 2025-07-18

## 2025-08-11 ENCOUNTER — OFFICE VISIT (OUTPATIENT)
Dept: OPTOMETRY | Facility: CLINIC | Age: 65
End: 2025-08-11
Payer: COMMERCIAL

## 2025-08-11 DIAGNOSIS — H25.13 NUCLEAR AGE-RELATED CATARACT, BOTH EYES: ICD-10-CM

## 2025-08-11 DIAGNOSIS — H52.4 PRESBYOPIA: ICD-10-CM

## 2025-08-11 DIAGNOSIS — H52.223 REGULAR ASTIGMATISM OF BOTH EYES: ICD-10-CM

## 2025-08-11 DIAGNOSIS — Z01.01 ENCOUNTER FOR EXAMINATION OF EYES AND VISION WITH ABNORMAL FINDINGS: Primary | ICD-10-CM

## 2025-08-11 PROCEDURE — 92014 COMPRE OPH EXAM EST PT 1/>: CPT | Performed by: OPTOMETRIST

## 2025-08-11 PROCEDURE — 92015 DETERMINE REFRACTIVE STATE: CPT | Performed by: OPTOMETRIST

## 2025-08-11 ASSESSMENT — VISUAL ACUITY
OS_CC+: -2
OS_SC+: -1
OD_CC: 20/40
CORRECTION_TYPE: GLASSES
OS_SC: 20/200
OD_SC: 20/200
OD_CC: 20/30+1
OD_CC+: -1
OS_CC: 20/20-2
OS_CC: 20/40
METHOD: SNELLEN - LINEAR
OD_SC: 20/70
OS_SC: 20/60

## 2025-08-11 ASSESSMENT — CONF VISUAL FIELD
OD_SUPERIOR_TEMPORAL_RESTRICTION: 0
OS_SUPERIOR_NASAL_RESTRICTION: 0
METHOD: COUNTING FINGERS
OD_INFERIOR_NASAL_RESTRICTION: 0
OD_INFERIOR_TEMPORAL_RESTRICTION: 0
OS_NORMAL: 1
OS_SUPERIOR_TEMPORAL_RESTRICTION: 0
OD_SUPERIOR_NASAL_RESTRICTION: 0
OD_NORMAL: 1
OS_INFERIOR_NASAL_RESTRICTION: 0
OS_INFERIOR_TEMPORAL_RESTRICTION: 0

## 2025-08-11 ASSESSMENT — REFRACTION_WEARINGRX
OS_ADD: +2.50
OS_CYLINDER: +0.75
OD_SPHERE: -0.75
OS_SPHERE: +0.50
OS_AXIS: 130
OD_CYLINDER: +2.00
OD_ADD: +2.50
SPECS_TYPE: BIFOCAL
OD_AXIS: 007

## 2025-08-11 ASSESSMENT — REFRACTION_MANIFEST
OD_AXIS: 003
OS_ADD: +2.50
OS_SPHERE: +1.00
OS_CYLINDER: +1.00
OD_SPHERE: -0.50
OD_ADD: +2.50
OS_AXIS: 165
OD_CYLINDER: +2.00

## 2025-08-11 ASSESSMENT — CUP TO DISC RATIO
OD_RATIO: 0.3
OS_RATIO: 0.25

## 2025-08-11 ASSESSMENT — EXTERNAL EXAM - LEFT EYE: OS_EXAM: NORMAL

## 2025-08-11 ASSESSMENT — TONOMETRY
OS_IOP_MMHG: 14
IOP_METHOD: APPLANATION
OD_IOP_MMHG: 16

## 2025-08-11 ASSESSMENT — EXTERNAL EXAM - RIGHT EYE: OD_EXAM: NORMAL

## 2025-08-18 DIAGNOSIS — D50.9 IRON DEFICIENCY ANEMIA, UNSPECIFIED IRON DEFICIENCY ANEMIA TYPE: ICD-10-CM

## 2025-08-20 ENCOUNTER — APPOINTMENT (OUTPATIENT)
Dept: OPTOMETRY | Facility: CLINIC | Age: 65
End: 2025-08-20
Payer: COMMERCIAL

## 2025-08-20 PROCEDURE — 92341 FIT SPECTACLES BIFOCAL: CPT | Performed by: OPTOMETRIST

## 2025-08-20 RX ORDER — FERROUS SULFATE 325(65) MG
TABLET ORAL
Qty: 90 TABLET | Refills: 0 | Status: SHIPPED | OUTPATIENT
Start: 2025-08-20